# Patient Record
Sex: FEMALE | Race: WHITE | Employment: FULL TIME | URBAN - METROPOLITAN AREA
[De-identification: names, ages, dates, MRNs, and addresses within clinical notes are randomized per-mention and may not be internally consistent; named-entity substitution may affect disease eponyms.]

---

## 2016-12-14 LAB
CHLAMYDIA, EXTERNAL: NEGATIVE
HBSAG, EXTERNAL: NEGATIVE
HIV, EXTERNAL: NEGATIVE
N. GONORRHEA, EXTERNAL: NEGATIVE
RPR, EXTERNAL: NONREACTIVE
RUBELLA, EXTERNAL: NORMAL

## 2017-06-18 ENCOUNTER — HOSPITAL ENCOUNTER (EMERGENCY)
Age: 32
Discharge: HOME | End: 2017-06-18
Attending: OBSTETRICS & GYNECOLOGY | Admitting: OBSTETRICS & GYNECOLOGY
Payer: SELF-PAY

## 2017-06-18 VITALS
DIASTOLIC BLOOD PRESSURE: 82 MMHG | RESPIRATION RATE: 18 BRPM | TEMPERATURE: 98.2 F | WEIGHT: 160 LBS | HEART RATE: 104 BPM | HEIGHT: 64 IN | SYSTOLIC BLOOD PRESSURE: 126 MMHG | BODY MASS INDEX: 27.31 KG/M2

## 2017-06-18 LAB
APPEARANCE UR: CLEAR
BILIRUB UR QL: NEGATIVE
COLOR UR: YELLOW
GLUCOSE UR QL STRIP.AUTO: NEGATIVE MG/DL
KETONES UR-MCNC: NEGATIVE MG/DL
LEUKOCYTE ESTERASE UR QL STRIP: NEGATIVE
NITRITE UR QL: NEGATIVE
PH UR: 7 [PH] (ref 5–9)
PROT UR QL: NEGATIVE MG/DL
RBC # UR STRIP: NEGATIVE /UL
SERVICE CMNT-IMP: NORMAL
SP GR UR: 1.01 (ref 1–1.02)
UROBILINOGEN UR QL: 0.2 EU/DL (ref 0.2–1)

## 2017-06-18 PROCEDURE — 99283 EMERGENCY DEPT VISIT LOW MDM: CPT

## 2017-06-18 PROCEDURE — 81003 URINALYSIS AUTO W/O SCOPE: CPT

## 2017-06-18 PROCEDURE — 59025 FETAL NON-STRESS TEST: CPT

## 2017-06-18 RX ORDER — VALACYCLOVIR HYDROCHLORIDE 500 MG/1
500 TABLET, FILM COATED ORAL 2 TIMES DAILY
COMMUNITY
End: 2017-07-21

## 2017-06-18 NOTE — ROUTINE PROCESS
35 weeks and 1 days gestation patient ambulated to unit with complaints of contractions. Patient taken to LTR1 and oriented to room and call bell. Patient provided POC urine sample. Patient states her contractions began at approximately 0700 this morning occurring approximately every 5-7 minutes. Patient states approximately 1-2 hours ago she took a warm bath and her contractions have decreased in frequency and intensity and she has not felt any contractions while here on the unit. Patient denies leaking of fluid and vaginal bleeding. Patient states she was checked in the office a few days ago and was told she was 2cm by FREDDIEM. Patient states she had intercourse less than 24 hours ago. 1505:  SVE /-3.    1535:  Report given to IVETTE Muniz and GUS will be on unit to assess patient in approximately 5 minutes. 1600:  IVETTE Muniz assess patient. Order received to discharge patient. Verbal and written d/c instructions given to pt r/t contractions, LOF, VB, daily fetal kick counts, hydration, fever, and diet. Pt verbalized understanding and denies any questions at this time. D/c instructions signed.

## 2017-06-18 NOTE — IP AVS SNAPSHOT
Summary of Care Report The Summary of Care report has been created to help improve care coordination. Users with access to [facility] or [address] (Web-based application) may access additional patient information including the Discharge Summary. If you are not currently a [address] user and need more information, please call the number listed below in the [place] [address] section and ask to be connected with Medical Records. Facility Information Name Address Phone [address] [place] [phone] Patient Information Patient Name Sex CAITLIN Guillory ([id number]) Female 1985 Discharge Information Admitting Provider Service Area Unit Kris Craig MD / [phone] [address] [facility] [address] [facility] / [phone] Discharge Provider Discharge Date/Time Discharge Disposition Destination (none) (none) (none) (none) Patient Language Language ENGLISH [13] Hospital Problems as of 2017  Reviewed: 2015  7:11 AM by Lisa Tyler MD  
 None Non-Hospital Problems as of 2017  Reviewed: 2015  7:11 AM by Lisa Tyler MD  
  
  
  
 Class Noted - Resolved Last Modified Active Problems HSV-2 infection complicating pregnancy (Chronic)  2015 - Present 2015 by Bob Ross CNM Entered by Bob Ross CNM Rh negative, maternal  2015 - Present 2015 by Lisa Tyler MD  
  Entered by Bob Ross CNM Hypothyroidism affecting pregnancy  2015 - Present 2015 by Bob Ross CNM Entered by Bob Ross CNM  
   (normal spontaneous vaginal delivery)  2015 - Present 2015 by Bob Ross CNM Entered by Bob Ross CNM   Anemia associated with acute blood loss  2015 - Present 2015 by aMgali Saha MD  
  Entered by Magali Saha MD  
  
You are allergic to the following Allergen Reactions Latex, Natural Rubber Rash Benadryl Allergy Sinus Child's (Diphenhydramine-Pseudoephed) Hives Current Discharge Medication List  
  
ASK your doctor about these medications Dose & Instructions Dispensing Information Comments  
 pnv w/o calcium-iron fum-fa 27-1 mg Tab Take  by mouth. Refills:  0 VALTREX 500 mg tablet Generic drug:  valACYclovir Dose:  500 mg Take 500 mg by mouth two (2) times a day. Indications: PREVENTION OF HERPES ZOSTER IN IMMUNOCOMPROMISED PATIENT Refills:  0 Current Immunizations Name Date Rho(D) Immune Globulin - IM 5/25/2015, 3/3/2015, 12/30/2013 Tdap 3/25/2015 Follow-up Information None Discharge Instructions None Chart Review Routing History No Routing History on File

## 2017-06-18 NOTE — IP AVS SNAPSHOT
Current Discharge Medication List  
  
ASK your doctor about these medications Dose & Instructions Dispensing Information Comments Morning Noon Evening Bedtime  
 pnv w/o calcium-iron fum-fa 27-1 mg Tab Your last dose was: Your next dose is: Take  by mouth. Refills:  0 VALTREX 500 mg tablet Generic drug:  valACYclovir Your last dose was: Your next dose is:    
   
   
 Dose:  500 mg Take 500 mg by mouth two (2) times a day. Indications: PREVENTION OF HERPES ZOSTER IN IMMUNOCOMPROMISED PATIENT Refills:  0

## 2017-06-18 NOTE — IP AVS SNAPSHOT
Jovanna Head 
 
 
 [address] [facility] [address] 
[phone] Patient: Jesi Bob MRN: [id number] You are allergic to the following Allergen Reactions Latex, Natural Rubber Rash Benadryl Allergy Sinus Child's (Diphenhydramine-Pseudoephed) Hives Recent Documentation Height Weight BMI OB Status Smoking Status 1.626 m 72.6 kg 27.46 kg/m2 Pregnant Never Smoker Emergency Contacts Name Discharge Info Relation Home Work Mobile Kiran Jiménez   [phone] About your hospitalization You were admitted on:  N/A You last received care in the:  [place] [address] [facility] You were discharged on:  June 18, 2017 Unit phone number:  [phone] Why you were hospitalized Your primary diagnosis was:  Not on File Providers Seen During Your Hospitalizations Provider Role Specialty Primary office phone Mica Covarrubias MD Attending Provider Obstetrics & Gynecology [phone] Your Primary Care Physician (PCP) Primary Care Physician Office Phone Office Fax NONE ** None ** ** None ** Follow-up Information None Current Discharge Medication List  
  
ASK your doctor about these medications Dose & Instructions Dispensing Information Comments Morning Noon Evening Bedtime  
 pnv w/o calcium-iron fum-fa 27-1 mg Tab Your last dose was: Your next dose is: Take  by mouth. Refills:  0 VALTREX 500 mg tablet Generic drug:  valACYclovir Your last dose was: Your next dose is:    
   
   
 Dose:  500 mg Take 500 mg by mouth two (2) times a day. Indications: PREVENTION OF HERPES ZOSTER IN IMMUNOCOMPROMISED PATIENT Refills:  0 Discharge Instructions None Discharge Instructions Attachments/References PREGNANCY: PRECAUTIONS (ENGLISH) Discharge Orders None Introducing [facility]! Kaveh Taylor introduces [employer] patient portal. Now you can access parts of your medical record, email your doctor's office, and request medication refills online. 1. In your internet browser, go to [email or web address] 2. Click on the First Time User? Click Here link in the Sign In box. You will see the New Member Sign Up page. 3. Enter your [employer] Access Code exactly as it appears below. You will not need to use this code after youve completed the sign-up process. If you do not sign up before the expiration date, you must request a new code. · [employer] Access Code: [email or web address] Expires: 9/16/2017  3:58 PM 
 
4. Enter the last four digits of your Social Security Number (xxxx) and Date of Birth (mm/dd/yyyy) as indicated and click Submit. You will be taken to the next sign-up page. 5. Create a [employer] ID. This will be your [employer] login ID and cannot be changed, so think of one that is secure and easy to remember. 6. Create a [employer] password. You can change your password at any time. 7. Enter your Password Reset Question and Answer. This can be used at a later time if you forget your password. 8. Enter your e-mail address. You will receive e-mail notification when new information is available in [address] 9. Click Sign Up. You can now view and download portions of your medical record. 10. Click the Download Summary menu link to download a portable copy of your medical information. If you have questions, please visit the Frequently Asked Questions section of the [employer] website. Remember, [employer] is NOT to be used for urgent needs. For medical emergencies, dial 911. Now available from your iPhone and Android! General Information Please provide this summary of care documentation to your next provider.  
  
  
    
    
 Patient Signature: ____________________________________________________________ Date:  ____________________________________________________________  
  
Scott Nim Provider Signature:  ____________________________________________________________ Date:  ____________________________________________________________ More Information Pregnancy Precautions: Care Instructions Your Care Instructions There is no sure way to prevent labor before your due date ( labor) or to prevent most other pregnancy problems. But there are things you can do to increase your chances of a healthy pregnancy. Go to your appointments, follow your doctor's advice, and take good care of yourself. Eat well, and exercise (if your doctor agrees). And make sure to drink plenty of water. Follow-up care is a key part of your treatment and safety. Be sure to make and go to all appointments, and call your doctor if you are having problems. It's also a good idea to know your test results and keep a list of the medicines you take. How can you care for yourself at home? · Make sure you go to your prenatal appointments. At each visit, your doctor will check your blood pressure. Your doctor will also check to see if you have protein in your urine. High blood pressure and protein in urine are signs of preeclampsia. This condition can be dangerous for you and your baby. · Drink plenty of fluids, enough so that your urine is light yellow or clear like water. Dehydration can cause contractions. If you have kidney, heart, or liver disease and have to limit fluids, talk with your doctor before you increase the amount of fluids you drink. · Tell your doctor right away if you notice any symptoms of an infection, such as: ¨ Burning when you urinate. ¨ A foul-smelling discharge from your vagina. ¨ Vaginal itching. ¨ Unexplained fever. ¨ Unusual pain or soreness in your uterus or lower belly. · Eat a balanced diet. Include plenty of foods that are high in calcium and iron. ¨ Foods high in calcium include milk, cheese, yogurt, almonds, and broccoli. ¨ Foods high in iron include red meat, shellfish, poultry, eggs, beans, raisins, whole-grain bread, and leafy green vegetables. · Do not smoke. If you need help quitting, talk to your doctor about stop-smoking programs and medicines. These can increase your chances of quitting for good. · Do not drink alcohol or use illegal drugs. · Follow your doctor's directions about activity. Your doctor will let you know how much, if any, exercise you can do. · Ask your doctor if you can have sex. If you are at risk for early labor, your doctor may ask you to not have sex. · Take care to prevent falls. During pregnancy, your joints are loose, and your balance is off. Sports such as bicycling, skiing, or in-line skating can increase your risk of falling. And don't ride horses or motorcycles, dive, water ski, scuba dive, or parachute jump while you are pregnant. · Avoid getting very hot. Do not use saunas or hot tubs. Avoid staying out in the sun in hot weather for long periods. Take acetaminophen (Tylenol) to lower a high fever. · Do not take any over-the-counter or herbal medicines or supplements without talking to your doctor or pharmacist first. 
When should you call for help? Call 911 anytime you think you may need emergency care. For example, call if: 
· You passed out (lost consciousness). · You have severe vaginal bleeding. · You have severe pain in your belly or pelvis. · You have had fluid gushing or leaking from your vagina and you know or think the umbilical cord is bulging into your vagina. If this happens, immediately get down on your knees so your rear end (buttocks) is higher than your head. This will decrease the pressure on the cord until help arrives. Call your doctor now or seek immediate medical care if: · You have signs of preeclampsia, such as: 
¨ Sudden swelling of your face, hands, or feet. ¨ New vision problems (such as dimness or blurring). ¨ A severe headache. · You have any vaginal bleeding. · You have belly pain or cramping. · You have a fever. · You have had regular contractions (with or without pain) for an hour. This means that you have 8 or more within 1 hour or 4 or more in 20 minutes after you change your position and drink fluids. · You have a sudden release of fluid from your vagina. · You have low back pain or pelvic pressure that does not go away. · You notice that your baby has stopped moving or is moving much less than normal. 
Watch closely for changes in your health, and be sure to contact your doctor if you have any problems. Where can you learn more? Go to http://lai-aaliyah.info/. Enter [id number] in the search box to learn more about \"Pregnancy Precautions: Care Instructions. \" Current as of: May 30, 2016 Content Version: 11.2 © 0203-0775 [employer]. Care instructions adapted under license by [employer] (which disclaims liability or warranty for this information). If you have questions about a medical condition or this instruction, always ask your healthcare professional. [address] any warranty or liability for your use of this information.

## 2017-06-20 LAB — GRBS, EXTERNAL: NEGATIVE

## 2017-07-20 ENCOUNTER — HOSPITAL ENCOUNTER (INPATIENT)
Age: 32
LOS: 1 days | Discharge: HOME | End: 2017-07-21
Attending: OBSTETRICS & GYNECOLOGY | Admitting: OBSTETRICS & GYNECOLOGY
Payer: COMMERCIAL

## 2017-07-20 PROBLEM — Z33.1 IUP (INTRAUTERINE PREGNANCY), INCIDENTAL: Status: ACTIVE | Noted: 2017-07-20

## 2017-07-20 LAB
ABO + RH BLD: NORMAL
BASOPHILS # BLD AUTO: 0 K/UL (ref 0–0.06)
BASOPHILS # BLD: 0 % (ref 0–2)
BLOOD GROUP ANTIBODIES SERPL: NORMAL
BLOOD GROUP ANTIBODIES SERPL: NORMAL
DIFFERENTIAL METHOD BLD: ABNORMAL
EOSINOPHIL # BLD: 0.1 K/UL (ref 0–0.4)
EOSINOPHIL NFR BLD: 1 % (ref 0–5)
ERYTHROCYTE [DISTWIDTH] IN BLOOD BY AUTOMATED COUNT: 15 % (ref 11.6–14.5)
HCT VFR BLD AUTO: 29.6 % (ref 35–45)
HGB BLD-MCNC: 9.5 G/DL (ref 12–16)
LYMPHOCYTES # BLD AUTO: 28 % (ref 21–52)
LYMPHOCYTES # BLD: 3.1 K/UL (ref 0.9–3.6)
MCH RBC QN AUTO: 24.9 PG (ref 24–34)
MCHC RBC AUTO-ENTMCNC: 32.1 G/DL (ref 31–37)
MCV RBC AUTO: 77.5 FL (ref 74–97)
MONOCYTES # BLD: 0.8 K/UL (ref 0.05–1.2)
MONOCYTES NFR BLD AUTO: 7 % (ref 3–10)
NEUTS SEG # BLD: 7.1 K/UL (ref 1.8–8)
NEUTS SEG NFR BLD AUTO: 64 % (ref 40–73)
PLATELET # BLD AUTO: 158 K/UL (ref 135–420)
PMV BLD AUTO: 11.3 FL (ref 9.2–11.8)
RBC # BLD AUTO: 3.82 M/UL (ref 4.2–5.3)
SPECIMEN EXP DATE BLD: NORMAL
WBC # BLD AUTO: 11.2 K/UL (ref 4.6–13.2)

## 2017-07-20 PROCEDURE — 65270000029 HC RM PRIVATE

## 2017-07-20 PROCEDURE — 86870 RBC ANTIBODY IDENTIFICATION: CPT | Performed by: ADVANCED PRACTICE MIDWIFE

## 2017-07-20 PROCEDURE — 75410000003 HC RECOV DEL/VAG/CSECN EA 0.5 HR

## 2017-07-20 PROCEDURE — 85461 HEMOGLOBIN FETAL: CPT | Performed by: OBSTETRICS & GYNECOLOGY

## 2017-07-20 PROCEDURE — 75410000000 HC DELIVERY VAGINAL/SINGLE

## 2017-07-20 PROCEDURE — 85025 COMPLETE CBC W/AUTO DIFF WBC: CPT | Performed by: ADVANCED PRACTICE MIDWIFE

## 2017-07-20 PROCEDURE — 74011250636 HC RX REV CODE- 250/636: Performed by: ADVANCED PRACTICE MIDWIFE

## 2017-07-20 PROCEDURE — 75410000002 HC LABOR FEE PER 1 HR

## 2017-07-20 PROCEDURE — 86900 BLOOD TYPING SEROLOGIC ABO: CPT | Performed by: ADVANCED PRACTICE MIDWIFE

## 2017-07-20 PROCEDURE — 74011250637 HC RX REV CODE- 250/637: Performed by: ADVANCED PRACTICE MIDWIFE

## 2017-07-20 PROCEDURE — 77030018749 HC HK AMNIO DISP DERY -A

## 2017-07-20 PROCEDURE — 36415 COLL VENOUS BLD VENIPUNCTURE: CPT | Performed by: ADVANCED PRACTICE MIDWIFE

## 2017-07-20 PROCEDURE — 86900 BLOOD TYPING SEROLOGIC ABO: CPT | Performed by: OBSTETRICS & GYNECOLOGY

## 2017-07-20 RX ORDER — AMOXICILLIN 250 MG
1 CAPSULE ORAL
Status: DISCONTINUED | OUTPATIENT
Start: 2017-07-20 | End: 2017-07-22 | Stop reason: HOSPADM

## 2017-07-20 RX ORDER — ZOLPIDEM TARTRATE 5 MG/1
5 TABLET ORAL
Status: DISCONTINUED | OUTPATIENT
Start: 2017-07-20 | End: 2017-07-22 | Stop reason: HOSPADM

## 2017-07-20 RX ORDER — ACETAMINOPHEN 325 MG/1
650 TABLET ORAL
Status: DISCONTINUED | OUTPATIENT
Start: 2017-07-20 | End: 2017-07-22 | Stop reason: HOSPADM

## 2017-07-20 RX ORDER — PROMETHAZINE HYDROCHLORIDE 25 MG/ML
25 INJECTION, SOLUTION INTRAMUSCULAR; INTRAVENOUS
Status: DISCONTINUED | OUTPATIENT
Start: 2017-07-20 | End: 2017-07-22 | Stop reason: HOSPADM

## 2017-07-20 RX ORDER — ONDANSETRON 2 MG/ML
4 INJECTION INTRAMUSCULAR; INTRAVENOUS
Status: DISCONTINUED | OUTPATIENT
Start: 2017-07-20 | End: 2017-07-20 | Stop reason: HOSPADM

## 2017-07-20 RX ORDER — TERBUTALINE SULFATE 1 MG/ML
0.25 INJECTION SUBCUTANEOUS
Status: DISCONTINUED | OUTPATIENT
Start: 2017-07-20 | End: 2017-07-20 | Stop reason: HOSPADM

## 2017-07-20 RX ORDER — OXYTOCIN/RINGER'S LACTATE 20/1000 ML
125 PLASTIC BAG, INJECTION (ML) INTRAVENOUS CONTINUOUS
Status: DISCONTINUED | OUTPATIENT
Start: 2017-07-20 | End: 2017-07-20 | Stop reason: HOSPADM

## 2017-07-20 RX ORDER — NALBUPHINE HYDROCHLORIDE 10 MG/ML
10 INJECTION, SOLUTION INTRAMUSCULAR; INTRAVENOUS; SUBCUTANEOUS
Status: DISCONTINUED | OUTPATIENT
Start: 2017-07-20 | End: 2017-07-20 | Stop reason: HOSPADM

## 2017-07-20 RX ORDER — LIDOCAINE HYDROCHLORIDE 10 MG/ML
20 INJECTION, SOLUTION EPIDURAL; INFILTRATION; INTRACAUDAL; PERINEURAL AS NEEDED
Status: DISCONTINUED | OUTPATIENT
Start: 2017-07-20 | End: 2017-07-20 | Stop reason: HOSPADM

## 2017-07-20 RX ORDER — HYDROMORPHONE HYDROCHLORIDE 1 MG/ML
1 INJECTION, SOLUTION INTRAMUSCULAR; INTRAVENOUS; SUBCUTANEOUS
Status: DISCONTINUED | OUTPATIENT
Start: 2017-07-20 | End: 2017-07-20 | Stop reason: HOSPADM

## 2017-07-20 RX ORDER — LIDOCAINE HYDROCHLORIDE 10 MG/ML
INJECTION INFILTRATION; PERINEURAL
Status: DISPENSED
Start: 2017-07-20 | End: 2017-07-20

## 2017-07-20 RX ORDER — METHYLERGONOVINE MALEATE 0.2 MG/ML
0.2 INJECTION INTRAVENOUS AS NEEDED
Status: DISCONTINUED | OUTPATIENT
Start: 2017-07-20 | End: 2017-07-20 | Stop reason: HOSPADM

## 2017-07-20 RX ORDER — MINERAL OIL
30 OIL (ML) ORAL AS NEEDED
Status: COMPLETED | OUTPATIENT
Start: 2017-07-20 | End: 2017-07-20

## 2017-07-20 RX ORDER — IBUPROFEN 400 MG/1
800 TABLET ORAL
Status: DISCONTINUED | OUTPATIENT
Start: 2017-07-20 | End: 2017-07-22 | Stop reason: HOSPADM

## 2017-07-20 RX ORDER — OXYTOCIN/RINGER'S LACTATE 20/1000 ML
500 PLASTIC BAG, INJECTION (ML) INTRAVENOUS ONCE
Status: COMPLETED | OUTPATIENT
Start: 2017-07-20 | End: 2017-07-20

## 2017-07-20 RX ORDER — OXYCODONE AND ACETAMINOPHEN 5; 325 MG/1; MG/1
2 TABLET ORAL
Status: DISCONTINUED | OUTPATIENT
Start: 2017-07-20 | End: 2017-07-22 | Stop reason: HOSPADM

## 2017-07-20 RX ORDER — BUTORPHANOL TARTRATE 2 MG/ML
2 INJECTION INTRAMUSCULAR; INTRAVENOUS
Status: DISCONTINUED | OUTPATIENT
Start: 2017-07-20 | End: 2017-07-20 | Stop reason: HOSPADM

## 2017-07-20 RX ORDER — SODIUM CHLORIDE, SODIUM LACTATE, POTASSIUM CHLORIDE, CALCIUM CHLORIDE 600; 310; 30; 20 MG/100ML; MG/100ML; MG/100ML; MG/100ML
125 INJECTION, SOLUTION INTRAVENOUS CONTINUOUS
Status: DISCONTINUED | OUTPATIENT
Start: 2017-07-20 | End: 2017-07-20 | Stop reason: HOSPADM

## 2017-07-20 RX ADMIN — Medication 30 ML: at 04:04

## 2017-07-20 RX ADMIN — Medication 10000 MILLI-UNITS/HR: at 04:04

## 2017-07-20 RX ADMIN — HUMAN RHO(D) IMMUNE GLOBULIN 0.3 MG: 300 INJECTION, SOLUTION INTRAMUSCULAR at 15:36

## 2017-07-20 NOTE — PROGRESS NOTES
Bedside and Verbal shift change report given to WEN Joshua (oncoming nurse) by Boston Longoria RN   (offgoing nurse). Report included the following information SBAR, Intake/Output, MAR and Recent Results.

## 2017-07-20 NOTE — PROGRESS NOTES
completed the initial Spiritual Assessment of the patient, and offered Pastoral Care, see flow sheets for interventions. Baby [place] Card was provided. Patient does not have any Spiritism/cultural needs that will affect patients preferences in health care. Chaplains will continue to follow and will provide pastoral care as needed or requested. [address] [place] [address] Kun Rojo.   Resident   [phone] - Office

## 2017-07-20 NOTE — PROGRESS NOTES
TRANSFER - IN REPORT:    Verbal report received from TRAV Romero (name) on Santhosh Estes  being received from L&D(unit) for routine progression of care      Report consisted of patients Situation, Background, Assessment and   Recommendations(SBAR). Information from the following report(s) SBAR, Procedure Summary, Intake/Output, MAR and Recent Results was reviewed with the receiving nurse. Opportunity for questions and clarification was provided. Vital Signs completed upon patients arrival to unit and care assumed. 0700 Bedside shift change report given to Fabricio Coleman RN (oncoming nurse) by Gema Sharma. Antonio Alvarez RN (offgoing nurse).  Report included the following information SBAR, Procedure Summary, Intake/Output, MAR and Recent Results.

## 2017-07-20 NOTE — LACTATION NOTE
Infant latched and nursing well, but mom on phone, will return. 8451 Breastfeeding basics and log sheet discussed.

## 2017-07-20 NOTE — L&D DELIVERY NOTE
Patient: Dhara Guardado                   Delivery Summary    Circumcision:   desires    Information for the patient's : Eugenia Roberts [[id number]]     Delivery Type: Vaginal, Spontaneous Delivery   Delivery Date: 2017   Delivery Time: 3:57 AM     Birth Weight: 4.237 kg     Sex:  male  Delivery Clinician:  Eloina Joy   Gestational Age: Gestational Age: 38w11d    Presentation: Vertex   Position: Right  Occiput  Anterior     Apgars were 8  and 9      Resuscitation Method: Suctioning-bulb; Tactile Stimulation     Meconium Stained: None  Living Status: Living       Placenta Date/Time:     Placenta Removal: Spontaneous   Placenta Appearance: Vertex [1]     Cord Information: 3 Vessels    Cord Events: Knot       Cord Blood Sent?:  Yes    Blood Gases Sent?:  No       Cord pH:  none    Episiotomy: None  Laceration(s):     Estimated Blood Loss (ml): 150    Labor Events  Method:      Augmentation:   Cervical Ripening:        Induction - no    Induction Indication - N/A    Induction Method - N/A    Complications - shoulder dystocia    NICU Admit - no    HTN Disorders - none    Diabetes - N/A    Operative Vaginal Delivery - none    Additional Delivery Comments - With strong maternal effort, infant head delivered ROSALBA, restituted and left anterior shoulder not visible. Head of bed lowered and patient positioned in [place] for brief shoulder dystocia. With maternal pushing efforts in Eleuterio, Left anterior shoulder delivered. Cord noted to be wrapped around right posterior shoulder. True knot identified as vigorous, male infant placed immediately on maternal abdomen. NICU paged to room for SD. Infant dried, stimulated, and bulb suctioned. Once cord stopped pulsating, cord was double clamped and FOB cut cord in between clamps. Postpartum IV pitocin bolus initiated. Cord blood collected. Infant taken to warmer once NICU team arrived. Placenta delivered spontaneously, barber, intact, with 3 vessel cord.  Fundus firm and 2 below umbilicus. Bleeding well controlled. No lacerations noted. . Maternal and infant VSS. Will begin routine postpartum and  care.

## 2017-07-20 NOTE — H&P
History & Physical    Name: Med Avalos MRN: [id number]  SSN: [id number]    YOB: 1985  Age: 32 y.o. Sex: female      Subjective:     Estimated Date of Delivery: 17  OB History    Para Term  AB Living   3 1 1 0 1 1   SAB TAB Ectopic Molar Multiple Live Births   1 0 0  0 1      # Outcome Date GA Lbr Kris/2nd Weight Sex Delivery Anes PTL Lv   3 Current            2 Term 05/24/15 39w5d 17:00 / 00:19 3.375 kg M VAGINAL DELI None N BRIDGET   1 SAB                   Ms. Gladis Aase is a  admitted with pregnancy at 39w5d for labor. Prenatal course was complicated by genital HSV with no active outbreaks during pregnancy and RH negative. Please see prenatal records for details. Past Medical History:   Diagnosis Date    Anemia associated with acute blood loss 2015    Currently pregnant     due 5/25/15    Herpes gestationis     Herpes simplex without mention of complication     Hypothyroidism affecting pregnancy 2015    Miscarriage     Rh negative, maternal      History reviewed. No pertinent surgical history. Social History     Occupational History    Not on file. Social History Main Topics    Smoking status: Never Smoker    Smokeless tobacco: Never Used    Alcohol use No    Drug use: No    Sexual activity: Yes     Partners: Male     Family History   Problem Relation Age of Onset    Hypertension Mother     Elevated Lipids Mother     Diabetes Father     Hypertension Father     Elevated Lipids Father     No Known Problems Brother     No Known Problems Maternal Uncle     Cancer Maternal Grandmother        Allergies   Allergen Reactions    Latex, Natural Rubber Rash    Benadryl Allergy Sinus Child's [Diphenhydramine-Pseudoephed] Hives     Prior to Admission medications    Medication Sig Start Date End Date Taking? Authorizing Provider   valACYclovir (VALTREX) 500 mg tablet Take 500 mg by mouth two (2) times a day.  Indications: PREVENTION OF HERPES ZOSTER IN IMMUNOCOMPROMISED PATIENT    Historical Provider   pnv w/o calcium-iron fum-fa 27-1 mg tab Take  by mouth. Historical Provider        Review of Systems: A comprehensive review of systems was negative except for that written in the History of Present Illness. Objective:     Vitals:  Vitals:    07/20/17 0112 07/20/17 0115   BP:  137/87   Pulse:  88   Resp:  17   Temp:  98.3 °F (36.8 °C)   Weight: 72.6 kg (160 lb)    Height: 5' 4\" (1.626 m)         Physical Exam:  Patient without distress. Heart: Regular rate and rhythm, S1S2 present or without murmur or extra heart sounds  Lung: clear to auscultation throughout lung fields, no wheezes, no rales, no rhonchi and normal respiratory effort  Abdomen: soft, nontender  Fundus: soft and non tender  Perineum: blood absent, amniotic fluid absent  Cervical Exam: 8cm per RN exam   Lower Extremities: no erythema, warmth, tenderness or edema   Membranes:  Intact  Fetal Heart Rate: Reactive  Baseline: 125 per minute  Variability: moderate  Accelerations: yes  Uterine contractions: regular, every 2-3 minutes          Prenatal Labs:   Lab Results   Component Value Date/Time    ABO/Rh(D) O NEGATIVE 07/20/2017 01:45 AM    Rubella, External IMMUNE 12/14/2016    HBsAg, External NEGATIVE 12/14/2016    HIV, External NEGATIVE 12/14/2016    RPR, External NONREACTIVE 12/14/2016    Gonorrhea, External NEGATIVE 12/14/2016    Chlamydia, External NEGATIVE 12/14/2016    ABO,Rh o- 11/04/2014    GrBStrep, External NEGATIVE 06/20/2017       Impression/Plan:     Active Problems:    IUP (intrauterine pregnancy), incidental (7/20/2017)         Plan: Admit for labor. Group B Strep negative.     Signed By:  Antonieta Hdez CNM     July 20, 2017

## 2017-07-20 NOTE — PROGRESS NOTES
0053 Pt arrived on unit with c/o ctx was taken to L&D room 4. Pt is 39.5, , GBS negative. 0116 VE 7-8/100/0 bulging bag.    0119 Damaris Osborn CNM, pt status report given, midwife is on her way. Eladio Harrison RN at bedside for assessment. 6302 Any Taylor, GUS at bedside to AROM moderate clear fluid. [address] removed per Michael Ireland CNM, NST is reactive. Pt up to shower. 0350 VE anterior lip per Talia Downs.    7710 Started pushing with patient using squat bar.    0357  of viable infant boy. 0403 Delivery of placenta.    0552 Pt assisted to BR to void, pericare complete and gown changed.  TRANSFER - OUT REPORT:    Verbal report given to Alvin John RN (name) on [facility]  being transferred to postpartum (unit) for routine progression of care       Report consisted of patients Situation, Background, Assessment and   Recommendations(SBAR). Information from the following report(s) SBAR, MAR and Recent Results was reviewed with the receiving nurse. Lines:   Peripheral IV 17 Left Hand (Active)        Opportunity for questions and clarification was provided.       Patient transported with:   Registered Nurse

## 2017-07-20 NOTE — IP AVS SNAPSHOT
[address] [address] 
[phone] Patient: Fritz Kahn MRN: [id number] KXX:[id number] You are allergic to the following Allergen Reactions Latex, Natural Rubber Rash Benadryl Allergy Sinus Child's (Diphenhydramine-Pseudoephed) Hives Immunizations Administered for This Admission Name Date Rho(D) Immune Globulin - IM 7/20/2017 Recent Documentation Height Weight Breastfeeding? BMI OB Status Smoking Status 1.626 m 72.6 kg Unknown 27.46 kg/m2 Recent pregnancy Never Smoker Emergency Contacts Name Discharge Info Relation Home Work Mobile [address] [3] Spouse [3] [phone] About your hospitalization You were admitted on:  July 20, 2017 You last received care in the:  [address] [place] [address] You were discharged on:  July 21, 2017 Unit phone number:  [phone] Why you were hospitalized Your primary diagnosis was:  Not on File Your diagnoses also included:  Iup (Intrauterine Pregnancy), Incidental  
  
  
 
  
  
Providers Seen During Your Hospitalizations Provider Role Specialty Primary office phone Rafael Selby MD Attending Provider Obstetrics & Gynecology [phone] Your Primary Care Physician (PCP) Primary Care Physician Office Phone Office Fax NONE ** None ** ** None ** Follow-up Information Follow up With Details Comments Contact Info None   None ([phone]) Patient stated that they have no PCP Rafael Selby MD In 6 weeks  [address] [address] 
[phone] Current Discharge Medication List  
  
START taking these medications Dose & Instructions Dispensing Information Comments Morning Noon Evening Bedtime  
 ferrous sulfate 325 mg (65 mg iron) tablet Your last dose was: Your next dose is: Dose:  325 mg Take 1 Tab by mouth Daily (before breakfast). Quantity:  30 Tab Refills:  0  
     
   
   
   
  
 ibuprofen 800 mg tablet Commonly known as:  MOTRIN Your last dose was: Your next dose is:    
   
   
 Dose:  800 mg Take 1 Tab by mouth every eight (8) hours as needed. Quantity:  90 Tab Refills:  0 CONTINUE these medications which have NOT CHANGED Dose & Instructions Dispensing Information Comments Morning Noon Evening Bedtime  
 pnv w/o calcium-iron fum-fa 27-1 mg Tab Your last dose was: Your next dose is: Take  by mouth. Refills:  0 STOP taking these medications VALTREX 500 mg tablet Generic drug:  valACYclovir Where to Get Your Medications Information on where to get these meds will be given to you by the nurse or doctor. ! Ask your nurse or doctor about these medications  
  ferrous sulfate 325 mg (65 mg iron) tablet  
 ibuprofen 800 mg tablet Discharge Instructions Recognize signs and symptoms of STROKE: 
 
F-face looks uneven A-arms unable to move or move unevenly S-speech slurred or non-existent T-time-call 911 as soon as signs and symptoms begin-DO NOT go Back to bed or wait to see if you get better-TIME IS BRAIN. Signed By: Sara Tena RN                                                                                                   Date: 7/21/2017 Time: 1:45 PM 
 
Patient armband removed and shredded Discharge Orders None Introducing [facility]! Ho Gonzalez introduces [employer] patient portal. Now you can access parts of your medical record, email your doctor's office, and request medication refills online. 1. In your internet browser, go to [email or web address] 2. Click on the First Time User? Click Here link in the Sign In box.  You will see the New Member Sign Up page. 3. Enter your [employer] Access Code exactly as it appears below. You will not need to use this code after youve completed the sign-up process. If you do not sign up before the expiration date, you must request a new code. · [employer] Access Code: [email or web address] Expires: 9/16/2017  3:58 PM 
 
4. Enter the last four digits of your Social Security Number (xxxx) and Date of Birth (mm/dd/yyyy) as indicated and click Submit. You will be taken to the next sign-up page. 5. Create a [employer] ID. This will be your [employer] login ID and cannot be changed, so think of one that is secure and easy to remember. 6. Create a [employer] password. You can change your password at any time. 7. Enter your Password Reset Question and Answer. This can be used at a later time if you forget your password. 8. Enter your e-mail address. You will receive e-mail notification when new information is available in [address] 9. Click Sign Up. You can now view and download portions of your medical record. 10. Click the Download Summary menu link to download a portable copy of your medical information. If you have questions, please visit the Frequently Asked Questions section of the [employer] website. Remember, [employer] is NOT to be used for urgent needs. For medical emergencies, dial 911. Now available from your iPhone and Android! General Information Please provide this summary of care documentation to your next provider. Patient Signature:  ____________________________________________________________ Date:  ____________________________________________________________  
  
Vicky Chambers Provider Signature:  ____________________________________________________________ Date:  ____________________________________________________________

## 2017-07-21 VITALS
TEMPERATURE: 98.2 F | HEART RATE: 90 BPM | WEIGHT: 160 LBS | DIASTOLIC BLOOD PRESSURE: 71 MMHG | OXYGEN SATURATION: 100 % | HEIGHT: 64 IN | BODY MASS INDEX: 27.31 KG/M2 | SYSTOLIC BLOOD PRESSURE: 122 MMHG | RESPIRATION RATE: 16 BRPM

## 2017-07-21 LAB
ABO + RH BLD: NORMAL
ABO + RH BLDCO: NORMAL
BLD PROD TYP BPU: NORMAL
BPU ID: NORMAL
CALLED TO:,BCALL1: NORMAL
FETAL SCREEN,FMHS: NORMAL
HCT VFR BLD AUTO: 28.7 % (ref 35–45)
HGB BLD-MCNC: 9.1 G/DL (ref 12–16)
STATUS OF UNIT,%ST: NORMAL
UNIT DIVISION, %UDIV: 0

## 2017-07-21 PROCEDURE — 85014 HEMATOCRIT: CPT | Performed by: ADVANCED PRACTICE MIDWIFE

## 2017-07-21 PROCEDURE — 85018 HEMOGLOBIN: CPT | Performed by: ADVANCED PRACTICE MIDWIFE

## 2017-07-21 PROCEDURE — 36415 COLL VENOUS BLD VENIPUNCTURE: CPT | Performed by: ADVANCED PRACTICE MIDWIFE

## 2017-07-21 RX ORDER — LANOLIN ALCOHOL/MO/W.PET/CERES
325 CREAM (GRAM) TOPICAL
Qty: 30 TAB | Refills: 0 | Status: SHIPPED | OUTPATIENT
Start: 2017-07-21

## 2017-07-21 RX ORDER — IBUPROFEN 800 MG/1
800 TABLET ORAL
Qty: 90 TAB | Refills: 0 | Status: SHIPPED | OUTPATIENT
Start: 2017-07-21

## 2017-07-21 NOTE — PROGRESS NOTES
Bedside shift change report given to WEN Bui (oncoming nurse) by YAYO Rich RN (offgoing nurse). Report included the following information SBAR and Kardex. EMS

## 2017-07-21 NOTE — DISCHARGE SUMMARY
Obstetrical Discharge Summary     Name: Denton Zelaya MRN: [id number]  SSN: [id number]    YOB: 1985  Age: 32 y.o. Sex: female      Admit Date: 2017    Discharge Date: 2017    Admitting Physician: Harriett Flores MD     Attending Physician:  Harriett Flores MD     Discharge Diagnoses:   Information for the patient's : Carmencita Lowe  [facility] [[id number]]   Delivery of a 4.237 kg male infant via Vaginal, Spontaneous Delivery on 2017 at 3:57 AM  by . Apgars were 8 and 9. Additional Diagnoses:   Problem List as of 2017  Date Reviewed: 2015          Codes Class Noted - Resolved    IUP (intrauterine pregnancy), incidental ICD-10-CM: Z33.1  ICD-9-CM: V22.2  2017 - Present        Anemia associated with acute blood loss ICD-10-CM: D62  ICD-9-CM: 285.1  2015 - Present        HSV-2 infection complicating pregnancy (Chronic) ICD-10-CM: O98.519, B00.9  ICD-9-CM: 647.60, 054.9  2015 - Present        Rh negative, maternal ICD-10-CM: O09.899  ICD-9-CM: V23.89  2015 - Present        Hypothyroidism affecting pregnancy ICD-10-CM: O99.280, E03.9  ICD-9-CM: 648.10, 244.9  2015 - Present         (normal spontaneous vaginal delivery) ICD-10-CM: O80  ICD-9-CM: 650  2015 - Present        RESOLVED: Active labor at term ICD-10-CM: Adina Mcelroy  ICD-9-CM: Adina Mcelroy  2015 - 2015              Lab Results   Component Value Date/Time    Rubella, External IMMUNE 2016    GrBStrep, External NEGATIVE 2017     Recent Labs      17   0353   HGB  9.1*       Hospital Course: Normal hospital course following the delivery. Patient Instructions:   Current Discharge Medication List      START taking these medications    Details   ibuprofen (MOTRIN) 800 mg tablet Take 1 Tab by mouth every eight (8) hours as needed. Qty: 90 Tab, Refills: 0      ferrous sulfate 325 mg (65 mg iron) tablet Take 1 Tab by mouth Daily (before breakfast).   Qty: 30 Tab, Refills: 0         CONTINUE these medications which have NOT CHANGED    Details   pnv w/o calcium-iron fum-fa 27-1 mg tab Take  by mouth. STOP taking these medications       valACYclovir (VALTREX) 500 mg tablet Comments:   Reason for Stopping:               Reference my discharge instructions.     Follow-up Appointments   Procedures    FOLLOW UP VISIT Appointment in: 6 Weeks     Standing Status:   Standing     Number of Occurrences:   1     Order Specific Question:   Appointment in     Answer:   6 Weeks        Signed By:  Corry Chavira CNM     July 21, 2017                       BST

## 2017-07-21 NOTE — PROGRESS NOTES
[address] [facility], [facility] removed IV from patient's left hand this morning. Site assessed and patient in stable condition. No further needs reported at this time.

## 2017-07-21 NOTE — DISCHARGE INSTRUCTIONS
Recognize signs and symptoms of STROKE:    F-face looks uneven    A-arms unable to move or move unevenly    S-speech slurred or non-existent    T-time-call 911 as soon as signs and symptoms begin-DO NOT go       Back to bed or wait to see if you get better-TIME IS BRAIN.         Signed By: Flory Zhang RN                                                                                                   Date: 7/21/2017 Time: 1:45 PM    Patient armband removed and shredded

## 2017-07-21 NOTE — PROGRESS NOTES
Post-Partum Day Number 1 Progress Note    Marylu Jacob     Assessment:   Hospital Problems  Date Reviewed: 2015          Codes Class Noted POA    IUP (intrauterine pregnancy), incidental ICD-10-CM: Z33.1  ICD-9-CM: V22.2  2017 Unknown            Doing well, post partum day 1    Plan:  1. Continue routine postpartum and perineal care as well as maternal education. 2. The risks and benefits of the circumcision  procedure and anesthesia including: bleeding, infection, variability of cosmetic results were discussed at length with the mother. She is aware that future repeat procedures may be necessary. She gives informed consent to proceed as noted and her questions are answered. 3. Discharge home today     Information for the patient's : Nav Rees [[id number]]   Vaginal, Spontaneous Delivery   Patient doing well without significant complaint. Voiding without difficulty, normal lochia. NO BM yet. Breastfeeding well with no concerns. Undecided about postpartum birth control at this time. Current Facility-Administered Medications   Medication Dose Route Frequency       Vitals:  Visit Vitals    /65 (BP 1 Location: Left arm)    Pulse 90    Temp 98 °F (36.7 °C)    Resp 16    Ht 5' 4\" (1.626 m)    Wt 72.6 kg (160 lb)    SpO2 99%    Breastfeeding Unknown    BMI 27.46 kg/m2     Temp (24hrs), Av.2 °F (36.8 °C), Min:98 °F (36.7 °C), Max:98.3 °F (36.8 °C)        Exam:   Patient without distress. Abdomen soft, fundus firm, nontender                Perineum with normal lochia noted. Lower extremities are negative for swelling, cords or tenderness.     Labs:     Lab Results   Component Value Date/Time    WBC 11.2 2017 01:45 AM    WBC 16.4 2015 01:05 AM    WBC 8.4 2013 07:50 PM    HGB 9.1 2017 03:53 AM    HGB 9.5 2017 01:45 AM    HGB 10.1 2015 03:45 AM    HCT 28.7 2017 03:53 AM    HCT 29.6 2017 01:45 AM HCT 31.0 05/25/2015 03:45 AM    PLATELET 777 07/52/1352 01:45 AM    PLATELET 480 51/75/8245 01:05 AM    PLATELET 878 94/66/1992 07:50 PM       Recent Results (from the past 24 hour(s))   HEMOGLOBIN    Collection Time: 07/21/17  3:53 AM   Result Value Ref Range    HGB 9.1 (L) 12.0 - 16.0 g/dL   HEMATOCRIT    Collection Time: 07/21/17  3:53 AM   Result Value Ref Range    HCT 28.7 (L) 35.0 - 45.0 %

## 2017-07-22 NOTE — PROGRESS NOTES
2000  Discharge instructions reviewed with patient and spouse, verbalized understanding. Printed D/C instructions and resources pamphlet given to patient. 2100  Patient in wheelchair holding baby, taken to main entrance by Alexander, unit secretary.   No distress noted

## 2025-05-06 NOTE — ROUTINE PROCESS
Bedside and Verbal shift change report given to YAYO Maynard (oncoming nurse) by Nazario Kim RN (offgoing nurse). Report included the following information SBAR, Intake/Output and MAR. MARY Duff